# Patient Record
Sex: MALE | Race: ASIAN | NOT HISPANIC OR LATINO | Employment: FULL TIME | URBAN - METROPOLITAN AREA
[De-identification: names, ages, dates, MRNs, and addresses within clinical notes are randomized per-mention and may not be internally consistent; named-entity substitution may affect disease eponyms.]

---

## 2018-06-07 ENCOUNTER — HOSPITAL ENCOUNTER (EMERGENCY)
Facility: HOSPITAL | Age: 58
Discharge: HOME | End: 2018-06-07
Attending: EMERGENCY MEDICINE
Payer: MEDICAID

## 2018-06-07 VITALS
HEART RATE: 69 BPM | HEIGHT: 61 IN | OXYGEN SATURATION: 100 % | TEMPERATURE: 98 F | DIASTOLIC BLOOD PRESSURE: 71 MMHG | BODY MASS INDEX: 24.73 KG/M2 | SYSTOLIC BLOOD PRESSURE: 150 MMHG | WEIGHT: 131 LBS | RESPIRATION RATE: 19 BRPM

## 2018-06-07 DIAGNOSIS — M54.16 LUMBAR RADICULOPATHY, ACUTE: Primary | ICD-10-CM

## 2018-06-07 PROCEDURE — 99283 EMERGENCY DEPT VISIT LOW MDM: CPT | Mod: 25

## 2018-06-07 PROCEDURE — 63600175 PHARM REV CODE 636 W HCPCS: Performed by: NURSE PRACTITIONER

## 2018-06-07 PROCEDURE — 96372 THER/PROPH/DIAG INJ SC/IM: CPT

## 2018-06-07 PROCEDURE — 25000003 PHARM REV CODE 250: Performed by: NURSE PRACTITIONER

## 2018-06-07 RX ORDER — DEXAMETHASONE SODIUM PHOSPHATE 4 MG/ML
8 INJECTION, SOLUTION INTRA-ARTICULAR; INTRALESIONAL; INTRAMUSCULAR; INTRAVENOUS; SOFT TISSUE
Status: COMPLETED | OUTPATIENT
Start: 2018-06-07 | End: 2018-06-07

## 2018-06-07 RX ORDER — PREDNISONE 50 MG/1
50 TABLET ORAL DAILY
Qty: 5 TABLET | Refills: 0 | Status: SHIPPED | OUTPATIENT
Start: 2018-06-07 | End: 2018-06-12

## 2018-06-07 RX ORDER — HYDROCODONE BITARTRATE AND ACETAMINOPHEN 10; 325 MG/1; MG/1
1 TABLET ORAL
Status: COMPLETED | OUTPATIENT
Start: 2018-06-07 | End: 2018-06-07

## 2018-06-07 RX ORDER — TRAMADOL HYDROCHLORIDE 50 MG/1
50 TABLET ORAL EVERY 6 HOURS PRN
Qty: 14 TABLET | Refills: 0 | Status: SHIPPED | OUTPATIENT
Start: 2018-06-07 | End: 2018-06-17

## 2018-06-07 RX ADMIN — DEXAMETHASONE SODIUM PHOSPHATE 8 MG: 4 INJECTION, SOLUTION INTRAMUSCULAR; INTRAVENOUS at 09:06

## 2018-06-07 RX ADMIN — HYDROCODONE BITARTRATE AND ACETAMINOPHEN 1 TABLET: 10; 325 TABLET ORAL at 09:06

## 2018-06-07 NOTE — ED PROVIDER NOTES
Encounter Date: 6/7/2018       History     Chief Complaint   Patient presents with    Back Pain     pain shoots down left leg- present for 5 weeks     58 year old male with complaint of left lower back pain with radiation into left leg X 5 weeks.  NO fall. NO trauma.  Moderate pain.  Worse with movement.  No loss of bowel or bladder function.  No alleviating factors.           Review of patient's allergies indicates:  No Known Allergies  History reviewed. No pertinent past medical history.  History reviewed. No pertinent surgical history.  History reviewed. No pertinent family history.  Social History   Substance Use Topics    Smoking status: Current Every Day Smoker    Smokeless tobacco: Not on file    Alcohol use Yes     Review of Systems   Constitutional: Negative for fever.   HENT: Negative for sore throat.    Respiratory: Negative for shortness of breath.    Cardiovascular: Negative for chest pain.   Gastrointestinal: Negative for nausea.   Genitourinary: Negative for dysuria.   Musculoskeletal: Positive for back pain.   Skin: Negative for rash.   Neurological: Negative for weakness.   Hematological: Does not bruise/bleed easily.       Physical Exam     Initial Vitals [06/07/18 0911]   BP Pulse Resp Temp SpO2   (!) 155/81 87 18 98.7 °F (37.1 °C) 98 %      MAP       105.67         Physical Exam    Nursing note and vitals reviewed.  Constitutional: He appears well-developed and well-nourished.   HENT:   Head: Normocephalic and atraumatic.   Eyes: Conjunctivae are normal. Pupils are equal, round, and reactive to light.   Neck: Normal range of motion. Neck supple.   Cardiovascular: Normal rate, regular rhythm, normal heart sounds and intact distal pulses.   Pulmonary/Chest: Breath sounds normal.   Abdominal: Soft. There is no rebound and no guarding.   Musculoskeletal: Normal range of motion.   No spine tenderness, left lateral lumbar tenderness, pain with ROM of lumbar spine, left straight leg positive at 45  degrees   Neurological: He is alert and oriented to person, place, and time. He has normal strength and normal reflexes.   Skin: Skin is warm and dry.   Psychiatric: He has a normal mood and affect. His behavior is normal. Thought content normal.         ED Course   Procedures  Labs Reviewed - No data to display       No orders to display                             Clinical Impression:   The encounter diagnosis was Lumbar radiculopathy, acute.                             Lorne Ragland NP  06/07/18 0975

## 2018-06-19 ENCOUNTER — INITIAL CONSULT (OUTPATIENT)
Dept: PHYSICAL MEDICINE AND REHAB | Facility: CLINIC | Age: 58
End: 2018-06-19
Payer: MEDICAID

## 2018-06-19 ENCOUNTER — HOSPITAL ENCOUNTER (OUTPATIENT)
Dept: RADIOLOGY | Facility: HOSPITAL | Age: 58
Discharge: HOME | End: 2018-06-19
Attending: PHYSICAL MEDICINE & REHABILITATION
Payer: MEDICAID

## 2018-06-19 VITALS — HEART RATE: 85 BPM | SYSTOLIC BLOOD PRESSURE: 135 MMHG | DIASTOLIC BLOOD PRESSURE: 80 MMHG

## 2018-06-19 DIAGNOSIS — M54.16 LUMBAR RADICULOPATHY, ACUTE: Primary | ICD-10-CM

## 2018-06-19 DIAGNOSIS — M54.16 LUMBAR RADICULOPATHY, ACUTE: ICD-10-CM

## 2018-06-19 PROCEDURE — 99213 OFFICE O/P EST LOW 20 MIN: CPT | Mod: PBBFAC,PO,25 | Performed by: PHYSICAL MEDICINE & REHABILITATION

## 2018-06-19 PROCEDURE — 72110 X-RAY EXAM L-2 SPINE 4/>VWS: CPT | Mod: TC,FY,PO

## 2018-06-19 PROCEDURE — 72110 X-RAY EXAM L-2 SPINE 4/>VWS: CPT | Mod: 26,,, | Performed by: RADIOLOGY

## 2018-06-19 PROCEDURE — 99204 OFFICE O/P NEW MOD 45 MIN: CPT | Mod: S$PBB,,, | Performed by: PHYSICAL MEDICINE & REHABILITATION

## 2018-06-19 PROCEDURE — 99999 PR PBB SHADOW E&M-EST. PATIENT-LVL III: CPT | Mod: PBBFAC,,, | Performed by: PHYSICAL MEDICINE & REHABILITATION

## 2018-06-19 RX ORDER — KETOROLAC TROMETHAMINE 10 MG/1
10 TABLET, FILM COATED ORAL 2 TIMES DAILY PRN
Qty: 14 TABLET | Refills: 0 | Status: SHIPPED | OUTPATIENT
Start: 2018-06-19 | End: 2018-06-19 | Stop reason: SDUPTHER

## 2018-06-19 RX ORDER — GABAPENTIN 300 MG/1
300 CAPSULE ORAL 3 TIMES DAILY
Qty: 90 CAPSULE | Refills: 1 | Status: SHIPPED | OUTPATIENT
Start: 2018-06-19 | End: 2018-10-01

## 2018-06-19 RX ORDER — GABAPENTIN 300 MG/1
300 CAPSULE ORAL 3 TIMES DAILY
Qty: 90 CAPSULE | Refills: 1 | Status: SHIPPED | OUTPATIENT
Start: 2018-06-19 | End: 2018-06-19 | Stop reason: SDUPTHER

## 2018-06-19 RX ORDER — KETOROLAC TROMETHAMINE 10 MG/1
10 TABLET, FILM COATED ORAL 2 TIMES DAILY PRN
Qty: 14 TABLET | Refills: 0 | Status: SHIPPED | OUTPATIENT
Start: 2018-06-19 | End: 2018-06-26

## 2018-06-19 NOTE — PATIENT INSTRUCTIONS
What Is Lumbar Epidural Injection?  A lumbar epidural injection, which contains a numbing medicine and a steroid, wont stop all low back and leg pain. But it can reduce pain and break the pain cycle. This cycle may begin when back pain makes it hard to move. Lack of movement can then slow down the healing process. By getting you back on your feet, the injection can help speed your recovery. Some people may feel more relief from an injection than others. And some people may need more than one injection to get relief. Usually, no more than 3 injections are done in a 12 month period. If the first injection did not help, it is less likely that another one will help.  A tool for diagnosis  An injection can help locate the source of pain. Also called a selective nerve block or a selective epidural, it numbs the roots of specific nerves. The effect lasts only briefly, but if you feel relief, it may indicate the source of the pain. If you feel no relief, it may mean that the pains source is at another level in your spine. Or it may mean that something other than inflammation is causing the pain. Injection results also may be used to help plan back surgery, if needed.  Possible risks and complications  Here are some risks of lumbar epidural:  · Spinal headache  · Bleeding (rare)  · Infection (rare)   Date Last Reviewed: 10/31/2015  © 6954-2213 [employer]. [address] [place] [address], [place] [address]. All rights reserved. This information is not intended as a substitute for professional medical care. Always follow your healthcare professional's instructions.        Lumbar Epidural Injection: Your Procedure  A lumbar epidural injection is an outpatient procedure. Its often done in a hospital or an outpatient surgery center. Before your injection, your healthcare provider will discuss how you need to prepare.  Getting ready  You may need to prepare by doing the following:  · Give the doctor a list of all  medicines you take, including aspirin and anti-inflammatories. (You may need to stop taking some of them before the injection.)  · You may be asked not to eat or drink anything for several hours before check-in.  · Arrange for an adult friend or family member to drive you home afterward.  · Bring any requested X-ray, CT, or MRI images on the day of the procedure.  During the procedure  The injection takes just a few minutes. But extra time is needed to get ready. You may be given medicine before the injection to help you relax:  · In some cases, monitoring devices may be attached to your chest or side. These devices measure your heart rate, breathing, and blood pressure.  · You lie on your stomach or side, depending on where the injection will be given. Your back is cleaned and may be covered with sterile towels.  · Medicine is given to numb the skin near the injection site.  · If X-ray imaging (fluoroscopy) is to be used, a contrast dye may be injected into your back. This helps your doctor get a better image.  · A local anesthetic (for numbing), steroids (for reducing inflammation), or both are injected into the epidural space.  The procedure is very safe, but there is a very small risk of infection or local reaction afterward. If you have increasing pain, headaches (especially when standing up) redness, fever, or symptoms of infection, seek medical attention right away.   After the procedure  Youll spend time in a recovery area after the procedure. Before going home, you may be asked to fill out another survey about your pain.  Date Last Reviewed: 11/1/2015 © 2000-2017 [employer]. [address] [place] [address]. All rights reserved. This information is not intended as a substitute for professional medical care. Always follow your healthcare professional's instructions.        Understanding Lumbar Radiculopathy    Lumbar radiculopathy is irritation or inflammation of a nerve root in  the low back. It causes symptoms that spread out from the back down one or both legs. To understand this condition, it helps to understand the parts of the spine:  · Vertebrae. These are bones that stack to form the spine. The lumbar spine contains the 5 bottom vertebrae.  · Disks. These are soft pads of tissue between the vertebrae. They act as shock absorbers for the spine.  · Spinal canal. This is a tunnel formed within the stacked vertebrae. In the lumbar spine, nerves run through this canal.  · Nerves. These branch off and leave the spinal canal, traveling out to parts of the body. As they leave the spinal canal, nerves pass through openings between the vertebrae. The nerve root is the part of the nerve that is closest to the spinal canal.  · Sciatic nerve. This is a large nerve formed from several nerve roots in the low back. This nerve extends down the back of the leg to the foot.  With lumbar radiculopathy, nerve roots in the low back become irritated. This leads to pain and symptoms. The sciatic nerve is commonly involved, so the condition is often called sciatica.  What causes lumbar radiculopathy?  Aging, injury, poor posture, extra body weight, and other issues can lead to problems in the low back. These problems may then irritate nerve roots. They include:  · Damage to a disk in the lumbar spine. The damaged disk may then press on nearby nerve roots.  · Degeneration from wear and tear, and aging. This can lead to narrowing (stenosis) of the openings between the vertebrae. The narrowed openings press on nerve roots as they leave the spinal canal.  · Unstable spine. This is when a vertebra slips forward. It can then press on a nerve root.  Other, less common things can put pressure on nerves in the low back. These include diabetes, infection, or a tumor.  Symptoms of lumbar radiculopathy  These include:  · Pain in the low back  · Pain, numbness, tingling, or weakness that travels into the buttocks, hip,  groin, or leg  · Muscle spasms  Treatment for lumbar radiculopathy  In most cases, your healthcare provider will first try treatments that help relieve symptoms. These may include:  · Prescription and over-the-counter pain medicines. These help relieve pain, swelling, and irritation.  · Limits on positions and activities that increase pain. But lying in bed or avoiding all movement is only recommended for a short period of time.  · Physical therapy, including exercises and stretches. This helps decrease pain and increase movement and function.  · Steroid shots into the lower back. This may help relieve symptoms for a time.  · Weight-loss program. If you are overweight, losing extra pounds may help relieve symptoms.  In some cases, you may need surgery to fix the underlying problem. This depends on the cause, the symptoms, and how long the pain has lasted.  Possible complications  Over time, an irritated and inflamed nerve may become damaged. This may lead to long-lasting (permanent) numbness or weakness in your legs and feet. If symptoms change suddenly or get worse, be sure to let your healthcare provider know.  When to call your healthcare provider  Call your healthcare provider right away if you have any of these:  · New pain or pain that gets worse  · New or increasing weakness, tingling, or numbness in your leg or foot  · Problems controlling your bladder or bowel   Date Last Reviewed: 3/10/2016  © 4564-8583 [employer]. [address] [place] [address], [place] [address]. All rights reserved. This information is not intended as a substitute for professional medical care. Always follow your healthcare professional's instructions.        Exercises to Strengthen Your Lower Back  Strong lower back and abdominal muscles work together to support your spine. The exercises below will help strengthen the lower back. It is important that you begin exercising slowly and increase levels gradually.  Always begin any  exercise program with stretching. If you feel pain while doing any of these exercises, stop and talk to your doctor about a more specific exercise program that better suits your condition.   Low back stretch  The point of stretching is to make you more flexible and increase your range of motion. Stretch only as much as you are able. Stretch slowly. Do not push your stretch to the limit. If at any point you feel pain while stretching, this is your (temporary) limit.  · Lie on your back with your knees bent and both feet on the ground.  · Slowly raise your left knee to your chest as you flatten your lower back against the floor. Hold for 5 seconds.  · Relax and repeat the exercise with your right knee.  · Do 10 of these exercises for each leg.  · Repeat hugging both knees to your chest at the same time.  Building lower back strength  Start your exercise routine with 10 to 30 minutes a day, 1 to 3 times a day.  Initial exercises  Lying on your back:  1. Ankle pumps: Move your foot up and down, towards your head, and then away. Repeat 10 times with each foot.  2. Heel slides: Slowly bend your knee, drawing the heel of your foot towards you. Then slide your heel/foot from you, straightening your knee. Do not lift your foot off the floor (this is not a leg lift).  3. Abdominal contraction: Bend your knees and put your hands on your stomach. Tighten your stomach muscles. Hold for 5 seconds, then relax. Repeat 10 times.  4. Straight leg raise: Bend one leg at the knee and keep the other leg straight. Tighten your stomach muscles. Slowly lift your straight leg 6 to 12 inches off the floor and hold for up to 5 seconds. Repeat 10 times on each side.  Standin. Wall squats: Stand with your back against the wall. Move your feet about 12 inches away from the wall. Tighten your stomach muscles, and slowly bend your knees until they are at about a 45 degree angle. Do not go down too far. Hold about 5 seconds. Then slowly return  to your starting position. Repeat 10 times.  2. Heel raises: Stand facing the wall. Slowly raise the heels of your feet up and down, while keeping your toes on the floor. If you have trouble balancing, you can touch the wall with your hands. Repeat 10 times.  More advanced exercises  When you feel comfortable enough, try these exercises.  1. Kneeling lumbar extension: Begin on your hands and knees. At the same time, raise and straighten your right arm and left leg until they are parallel to the ground. Hold for 2 seconds and come back slowly to a starting position. Repeat with left arm and right leg, alternating 10 times.  2. Prone lumbar extension: Lie face down, arms extended overhead, palms on the floor. At the same time, raise your right arm and left leg as high as comfortably possible. Hold for 10 seconds and slowly return to start. Repeat with left arm and right leg, alternating 10 times. Gradually build up to 20 times. (Advanced: Repeat this exercise raising both arms and both legs a few inches off the floor at the same time. Hold for 5 seconds and release.)  3. Pelvic tilt: Lie on the floor on your back with your knees bent at 90 degrees. Your feet should be flat on the floor. Inhale, exhale, then slowly contract your abdominal muscles bringing your navel toward your spine. Let your pelvis rock back until your lower back is flat on the floor. Hold for 10 seconds while breathing smoothly.  4. Abdominal crunch: Perform a pelvic tilt (above) flattening your lower back against the floor. Holding the tension in your abdominal muscles, take another breath and raise your shoulder blades off the ground (this is not a full sit-up). Keep your head in line with your body (dont bend your neck forward). Hold for 2 seconds, then slowly lower.  Date Last Reviewed: 6/1/2016  © 9410-9145 [employer]. [address] [place] [address], [place] [address]. All rights reserved. This information is not intended as a  substitute for professional medical care. Always follow your healthcare professional's instructions.        Possible Causes of Low Back or Leg Pain    The symptoms in your back or leg may be due to pressure on a nerve. This pressure may be caused by a damaged disk or by abnormal bone growth. Either way, you may feel pain, burning, tingling, or numbness. If you have pressure on a nerve that connects to the sciatic nerve, pain may shoot down your leg.    Pressure from the disk  Constant wear and tear can weaken a disk over time and cause back pain. The disk can then be damaged by a sudden movement or injury. If its soft center begins to bulge, the disk may press on a nerve. Or the outside of the disk may tear, and the soft center may squeeze through and pinch a nerve.    Pressure from bone  As a disk wears out, the vertebrae right above and below the disk begin to touch. This can put pressure on a nerve. Often, abnormal bone (called bone spurs) grows where the vertebrae rub against each other. This can cause the foramen or the spinal canal to narrow (called stenosis) and press against a nerve.  Date Last Reviewed: 10/4/2015  © 9720-3016 [employer]. [address] [place] [address]. All rights reserved. This information is not intended as a substitute for professional medical care. Always follow your healthcare professional's instructions.           Tiêm trên màng c?ng ngang l?ng (Lumbar Epidural Injection) là gì?  Bác s? c?a quý v? có th? ?ã khuyên quý v? nên tiêm ashn màng c?ng ngang l?ng. Th? thu?t này có th? giúp gi?m ?au l?ng d??i và ?au chân nh? gi?m s?ng viêm (s?ng phù và [facility]). Tiêm c?ng có th? giúp bác s? c?a quý v? ch?n ?oán dhara?n g?c gây ?au b?ng cách gây tê m?t s? khu v?c nh?t ??nh ? l?ng c?a quý v?. Ch? quý v? ???c tiêm ph? thu?c vào m?c ?ích c?a vi?c tiêm.    Cách gi?m ?au  Tiêm ashn màng c?ng nickang l?ng s? mary d?ng eileen b? ph?n ?au l?ng d??i jarod tipton c?a quý v?. Nh?ng nó có  th? gi?m ?au và phá v? salmeron k? ?au. Salmeron k? này có th? b?t ??u khi ?au l?ng h?n ch? vi?c rubén?n ??ng. Vi?c thi?u rubén?n ??ng l?i làm ch?m quá trình h?i ph?c. V?i vi?c ??ng lên ?i l?i, thu?c tiêm có th? giúp ??y nhanh quá trình h?i ph?c c?a quý v?. M?t s? ng??i có th? c?m th?y gi?m ?au nh? tiêm luisa?u h?n so v?i nh?ng ng??i khác. M?t s? ng??i l?i có th? c?n tiêm luisa?u h?n m?t l?n thì m?i gi?m ?au ???c.  D?ng c? ch?n ?oán  Tiêm có th? giúp phát hi?n dhara?n g?c gây ?au. Còn ???c g?i là jese b? th?n kinh (nerve block) ch?n l?c hay trên màng c?ng (epidural) ch?n l?c, tiêm làm tê r? các dây th?n kinh c? th?. Tác d?ng ch? kéo dài marissa th?i denice r?t ng?n. Nh?ng n?u quý v? c?m th?y gi?m ?âu, ?i?u ?ó có th? báo hi?u ?ó là dhara?n g?c gây ?au. N?u quý v? không th?y gi?m ?au, ?i?u ?ó có th? ??ng ngh?a dhara?n g?c gây ?au ? m?t c?p c?t s?ng khác. Ho?c có th? là do nguyên nhân khác không ph?i viêm s?ng m?i gây ra c?n ?au. K?t qu? tiêm c?ng có th? ???c dùng ?? giúp l?p k? ho?ch ph?u thu?t l?ng n?u c?n.       Các r?i ro và bi?n ch?ng có th? c?a tiêm trên màng c?ng ngang l?ng  · ?âu c?t s?ng  · Ch?y máu (hi?m)  · Luisa?m trùng (hi?m)      Date Last Reviewed: 10/31/2015  © 6874-8607 [employer]. [address] [place] [id number]. All rights reserved. This information is not intended as a substitute for professional medical care. Always follow your healthcare professional's instructions.        Tiêm trên màng c?ng ngang l?ng (Lumbar Epidural Injection): Th? thu?t c?a quý v?  Tiêm trên màng c?ng ngang l?ng là m?t th? thu?t mcclure?i trú. Nó th??ng ???c th?c hi?n ? b?nh vi?n ho?c m?t arlene tâm ph?u thu?t mcclure?i trú. Tr??c khi tiêm, nhân viên y t? c?a quý v? s? th?o froilan?n v? công tác salmeron?n b? c?a quý v?.  S?n sàng  Quý v? có th? c?n ph?i salmeron?n b? b?ng cách vi?c josé miguel:  · ??a cho bác s? m?t alda sách t?t c? các lo?i thu?c mà quý v? dùng, falguni g?m aspirin và thu?c ch?ng viêm. (Quý v? có th? c?n ph?i d?ng dùng m?t s? lo?i thu?c tr??c  "khi tiêm.)  · Không ???c ?n. 6 gi? tr??c khi vào khám hay u?ng b?t c? th? gì. 4 gi? tr??c ?ó.  · B? trí m?t ng??i b?n ho?c thành viên eliza ?ình l?n tu?i ch? quý v? v? nhà katherine ?ó.  · [place] brennen ?nh ch?p X-dony, CT ho?c MRI brennen yêu c?u vào ngày th?c hi?n th? thu?t.    Arjun khi th?c hi?n th? thu?t  Tiêm ch? m?t vài phút. Nh?ng c?n thêm th?i denice ?? barahona?n b? s?n sàng. Quý v? có th? ???c tiê thu?c tr??c khi tiêm ?? giúp th? giãn.  · Thi?t b? brennen dõi có th? ???c g?n lên ng?c ho?c s??n c?a quý v?. Các thi?t b? này brennen dõi nh?p leodan, h?i th? và tara?t áp c?a quý v?.  · Quý v? n?m úp b?ng ho?c n?m nghiêng, tùy vào v? trí tiêm. L?ng c?a quý v? ???c webb và ph? m?t l?p kh?n ti?t trùng.  · Thu?c ???c dùng ?? làm tê ph?n da g?n ?i?m tiêm.  · N?u s? d?ng phóng x? ragini?n (t?o ?nh X-dony), m?t "ch?t t?o màu" t??ng ph?i có th? ???c tiêm vào l?ng c?a quý v?. Ch?t này giúp t?o hình ?nh ch?t t?t h?n.  · M?t lo?i thu?c gây mê c?c b? (?? làm tê), steroid (?? gi?m s?ng viêm) ho?c c? willie ???c tiêm vào khoang trên màng c?ng.  Katherine khi th?c hi?n th? thu?t  Quý v? m?t beena?ng m?t gi? ? khu h?i s?c. Tr??c khi v? nhà, quý v? có th? ???c yêu c?u ?i?n vào m?t b?ng ?i?u tra khác v? c?n ?au c?a mình.  Date Last Reviewed: 11/1/2015  © 3248-9887 [employer]. [address] [place] [address], [place] [address]. All rights reserved. This information is not intended as a substitute for professional medical care. Always follow your healthcare professional's instructions.        B?nh ?au Th?n Kinh T?a [Sciatica]  B? nh ?au t h?n kinh t?a b?nh r? th?n kinh ? th? t l?ng  (sciatica "Lumbar Radiculopathy") gây nên c?n ?au shahbaz t ?a t? l?ng d? ?i jenkins? ng ? ?n mông, hông và c?ng chân. ?ôi lúc, ?au c ?ng chân có th? x?y ra mà không có ?au l?ng . B? nh ? au th?n kinh t?a do kích thích ho?c áp l? c ?è lên dây th ?n kinh c?t s?ng khi nó ra kh?i ?ng s?ng. ?i ?u này ? a s? là do ? ?a s?ng (l? p ? ?m s?n gi?a m?i x??ng c ?t s?ng) g? n ?ó b ? phình ho?c thoát v?, ?è " lên dây th ?n kinh g? n ?ó . Các nguyên nhân khác falguni g?m h?p c?t s?ng (h?p ?ng s?ng) và co th?t c?a b?p th?t hình qu? lê (m?t b?p th?t ? mông mà các dây th?n kinh t? a ?i quentin ).    Ch? ng ?au th?n kinh t?a có th? b? t ? ?u josé miguel m?t s?c l?c v?n tr?o/cúi jenkins? ng ? ?t ng? t (nh? marissa m ?t richard n? n xe h?i), ho?c josé miguel m?t c? ? ?ng v?ng v? ??n gi ?n. C ho dù tr? ?ng h?p nào, th? ?ng có s? co th?t c?a b?p th?t và nó góp ph? n vào c?n ?au.  S? ch? n ?oán ch ? ng ? au th?n kinh t?a ?? ?c th?c hi?n t? các tri?u ch?ng và khám s?c kh?e. Tr? khi quý v? ? ã b? ch? n th??ng v ? th? ch?t ( nh? richard n?n xe h?i ho?c té ngã) còn thì ch?p dony ragini? n th? ? ng không ?? ?c ch? ? ? nh ? ? th? m ? ?nh s? kh ?i ch? ng ?au th ?n kinh t?a b?i vì các dây th? n kinh và ? ?a không th? th? y ?? ?c marissa hình ch?p dony ragini?n. N?u có các d?u hi?u dây th?n kinh b? ?è nén (ch?ng h? n nh? m?t ph?n x? c?a gân ho?c s?c m?nh ? c?ng chân), s? c?n ph?i s?p x?p làm MRI (ch?p c? ng h? ?ng t?) v? i t? cách b?nh nhân mcclure?i trú.  Ph?n l?n b? nh ? au th?n kinh t?a (80-90%) ? ? h?n v ?i thu?c, t?p th? d?c, v?t lý tr? li?u. N?u các tri?u ch?ng ti?p t?c josé miguel khi ?i ?u tr? ít nh?t là ba tháng, có th? ph?i xem xét ? ?n vi?c làm ph?u thu?t.  Ch?m Sóc T?i Milka:  1) Quý v? có th? c?n n?m ngh? trê n gi? ?ng marissa nh? ng ngày ? ?u. Nh?ng h ãy b? t ? ?u ng?i d?y ho? c ?i l?i càng s?m càng t? t ? ? tránh các v? n ? ? gây nên do ngh? ng?i trên gi? ?ng quá lâu.  2) Khi n? m trên g? ?ng, c? ki?m m? t t? th ? tho?i mái. N?m c?ng là t?t nh?t. Ráng n?m th? ng l?ng, kê g ? i d? ? i ? ?u g?i c?a quý v?. Quý v? c?ng có th? th? n? m nghiêng, ? ?u g?i g?p l?i v? phía ng?c và kê g?i gi?a willie ? ?u g?i c?a quý v?.  3) Tránh ng?i lâu. ?i ?u này ? ?t s? c ép lên l?ng d? ?i nola?u h?n là ? ?ng ho? c ?i.  4) M?t s? ng? ?i th?y b? t ?au b ?ng cách dùng h?i nóng (vòi sen, b?n t?m nóng ho?c mi? ng lót ? ?m nóng) và kimberly estrada , marissa chavez nh?nick ng? ? i anirudh english ? ?m l?nh (?á v ?n ho? c ?á c ?c b?  marissa túi plastic và b? c marissa kh?n). Hãy th? c? willie ph??ng pháp và dùng ph??ng pháp nào cho là t ?t nh?t marissa 20 phút và vài l?n m?i ngày.  5) Quý v? có th? dùng acetaminophen (Tylenol) ho? c ibuprofen (Motrin, Advil) ? ? ki? m soát c?n ?au, tr ? khi ? ã ?? ?c cho dùng m?t lo?i thu?c ch? ng ?au khác. [L?U Ý: N?u quý v? b? b?nh esdras ho?c th?n mãn tính ho?c t?ng b? loét falguni t? (stomach ulcer) ho?c jenkins?t tara? t ?? ?ng tiêu hóa (GI bleeding), hãy bàn v?i bác s? c?a quý v? tr? ?c khi dùng các lo?i thu?c này.]  6) Nên bi? t các ph??ng pháp nâng v ?t n?ng m? t cách an toàn và ? ?ng nâng v?t nào trên 15 pound cho ? ?n khi h?t ?au .  Ti?p T?c Tommy Dõi  v?i bác s? c?a quý v? ho?c v? i c? s ? này n?u các tri?u ch?ng c?a quý v? không b? t ? ? u ? ? h?n josé miguel m ?t tu?n l?. Có th? c?n làm v?t lý tr? li?u ho?c các xét moustapha?m thêm.  [L?U Ý: N? u ? ã ch?p dony ragini?n, bác s? chuyên lio dony ragini?n s? xem l?i vi?c này. Quý v? s? ?? ?c thông báo v? b?t k? khám phá m?i nào có th? ? nh h? ? ng ? ?n vi? c ch?m sóc c ?a quý v?.]  N?u x?y ra b?t c? ?i?u nào josé miguel ?ây hãy L?P T?C ?I?U TR? Y T?:  -- C?n ?au tr ? nên t? h?n , không ki? m soát ?? ?c b?ng thu? c ? ã ?? ?c ch? ? ?nh  -- Gideon y?u ho?c tê m?t ho?c c? willie c?ng chân  -- Tê n?i háng, b? ph?n sinh d?c  -- Không ki?m ch? ?? ? c ? ?i ti?n ho?c ti?u ti?n  Date Last Reviewed: 7/24/2014  © 7183-5690 [employer]. [address] [place] [address], [place] [address]. All rights reserved. This information is not intended as a substitute for professional medical care. Always follow your healthcare professional's instructions.        Các nguyên nhân gây ?au ngang l?ng (l?ng d??i)  ?au l?ng d??i có th? ???c gây ra b?i các v?n ?? v?i ph?n c?t s?ng ngang l?ng. M?t ??a ??m có th? ?ã b? thoát v? (??y ra ngoài) và ?è lên dây th?n kinh. ??t s?ng có th? c? xát vào nhau ho?c tr??t ra kh?i v? trí. Chúng có th? kích ?ng các kh?p m?m bên và dây th?n kinh. ?i?u này c?ng có th? d?n t?i ch?ng chít h?p  (stenosis), h?p c?t s?ng ho?c l?.  Áp l?c t? ??a ??m  Th??ng xuyên c? xát và mòn ??a ??m làm ??a y?u ?i và ??y ra ngoài. Khi ?ó, m?t ph?n ??a ?è lên dây th?n kinh bên c?nh. Có willie lo?i ??a thoát v? th??ng g?p.  Bên marissa có ngh?a là ph?n h?ch m?m nhô ra ngoài.     Ch?a ??a thoát v?    Nhô ra ngoài có ngh?a là vành ch?c b? rách, ?? l? ph?n gi?a m?m nhô ra.     ??a thoát v? nhô ra      Áp l?c t? x??ng  C?t s?ng không ?n ??nh   Tommy tu?i tác, ??a ??m b? m?ng và y?u ?i. ??t s?ng bên trên và bên d??i ??a ??m b?t ??u ch?m vào nhau. ?i?u này có th? t?o áp l?c lên dây th?n kinh. Nó c?ng có th? t?o gai x??ng (x??ng phát tri?n) n?i x??ng c? xát vào nhau.     Chít h?p   Ch?ng chít h?p x?y ra khi gai x??ng làm h?p l? ho?c c?t s?ng. ?i?u này c?ng có th? t?o áp l?c lên dây th?n kinh. ??t s?ng b? tr??t có th? kích ?ng dây th?n kinh và kh?p. Chúng c?ng có th? làm ch?ng chít h?p t? h?n.     Ch?ng tr??t c?t s?ng   Marissa m?t s? tr??ng h?p, ??t s?ng tr? nên inésông ?n ??nh và b? tr??t v? phía tr??c. ?ây ???c g?i là b? ch?ng tr??t c?t s?ng (spondylolisthesis).     Date Last Reviewed: 10/12/2015  © 3583-2335 [employer]. [address] [place] [address], [place] [address]. All rights reserved. This information is not intended as a substitute for professional medical care. Always follow your healthcare professional's instructions.        Tiêm trên màng c?ng ngang l?ng (Lumbar Epidural Injection): Ph?c h?i ? nhà    Quý v? không c?n ph?i n?m trên gi??ng khi ? nhà. Th?c t?, t?t nh?t là nên ?i b? loanh quanh n?u quý v? ?? kh?e. Ch? c?n c?n th?n không v?n ??ng quá m?c. K? c? n?u quý v? ngay l?p t? th?y kh?e h?n thì c?ng nên tránh các ho?t ??ng khi?n l?ng c?a quý v? c?ng tr? l?i. Và hãy tuân th? m?i deanna trình ?i?u tr? v?i bác s? c?a quý v?.  ?i?u c?n bi?t v? gi?m ?au  Hãy l?u ý r?ng m?t s? b?nh nhân tho?t ??u s? c?m th?y ?au h?n. C?n ?au th??ng s? h?t josé miguel vài ngày. Quý v? c?ng có th? b? ?au ??u ho?c khó ng?. ?i?u này c?ng s? h?t ch? josé miguel vài  ngày. Nhpasquale davis:  · Tiêm gi?m s?ng viêm ph?i m?t m?t willie ngày m?i có tác d?ng. Th?m chí ban ??u quý v? có th? c?m th?y ?au h?n.  · Tiêm giúp xác ??nh dhara?n g?c gây ?au ch? có tác d?ng gi?m ?au t?m th?i. Katherine ?ó, quý v? s? c?m th?y ?au nh? tr??c khi tiêm.   Các m?o ?? h?i ph?c  Dù quý v? có ???c tiêm ?? gi?m ?au hay do ch?n ?oán, các m?o katherine s? v?n giúp quý v? h?i ph?c:  · ?i b? khi quý v? th?y ?? kh?e.  · Ngh? ng?i n?u c?n, nh?ng hãy ng?i d?y và di rubén?n loanh quanh katherine khi ng?i beena?ng n?a gi?.  · ??ng t?p luy?n quá m?nh.  · Không ???c lái xe vào ngày ti?n hành th? thu?t ho?c cho t?i khi bác s? c?a quý v? nói ??ng ý.  · Iberia l?i làm vi?c ho?c các ho?t ??ng khác khi bác s? c?a quý v? nói r?ng quý v? ?ã s?n sàng.  Khi nào thì g?i cho bác s? c?a quý v?  Hãy g?i ngay cho bác s? c?a quý v? n?u quý v? nh?n th?y b?t k? tri?u ch?ng nào katherine ?ây:  · ?au ho?c ?au ??u d? d?i  · M?t ki?m soát bàng dony ho?c ru?t  · S?t ho?c run  · T?y ?? ho?c s?ng quanh ?i?m tiêm       Date Last Reviewed: 11/1/2015  © 4888-9173 [employer]. [address] [place] [address], [place] [address]. All rights reserved. This information is not intended as a substitute for professional medical care. Always follow your healthcare professional's instructions.        Gi?m ?au l?ng  ?au l?ng là m?t v?n ?? th??ng g?p. Quý v? có th? làm c?ng c? l?ng do bê ?? quá n?ng ho?c di rubén?n jasper cách. S? c?ng c? l?ng có th? gây c?m giác khó ch?u, th?m chí là ?au. Có th? ph?i m?t vài tu?n m?i ph?c h?i ???c. ?? t? giúp b?n thân c?m th?y t?t h?n và ng?n ng?a s? c?ng c? l?ng v? katherine, hãy th? các m?o katherine:  L?u ý jordi tr?ng: Không ???c ??a aspirin cho tr? em ho?c tr? v? thành niên.        ? Ch??m ?á  ?á làm gi?m s? c?ng c? l?ng và s?ng phù. ?á có tác d?ng nola?u nh?t n?u denys ann [facility] 24-48 gi? katherine khi b? th??ng.  · B?c falguni ho?c túi ?á v?n marissa m?t t?m v?i webb ??a. (Không ???c ch??m ?á tr?c ti?p lên da c?a quý v?.)  · ??t ?á lên vùng l?ng b? ?au nola?u nh?t c?a malikaý  v?.  · Không ???c ch??m ?á lâu quá 20 phút m?i l?n.  · Quý v? nên s? d?ng ?á và l?n m?i ngày.  ? Dùng thu?c  Thu?c gi?m ?au bán quentin qu?y g?m có aspirin, acetaminophen và ibuprofen. Chúng có th? giúp làm gi?m c?m giác khó ch?u. M?t s? thu?c c?ng gi?m s?ng phù.  · Hãy nói cho bác s? c?a quý v? v? các lo?i thu?c mà quý v? ?ã dùng.  · Ch? ???c u?ng thu?c brennen ch? d?n.  ? Ch??m nola?t  Katherine 50 gi? ??u tiên, nola?t có th? làm giãn c? và c?i thi?n tu?n hoàn máu.  · Th? t?m b?ng b?n ho?c vòi barrett sen n??c ?m. Ho?c s? d?ng ??m s??i ?m ??t ? ch? ?? th?p. ?? tránh b? b?ng, hãy ?? m?t t?m v?i cách gi?a quý v? và ??m s??i.  · Không ???c s? d?ng ??m s??i lâu quá 15 phút m?i l?n. Không ???c ng? trên ??m s??i.  Date Last Reviewed: 9/1/2015  © 7162-8963 [employer]. [address] [place] [address], [place] [address]. All rights reserved. This information is not intended as a substitute for professional medical care. Always follow your healthcare professional's instructions.        ?au L?ng [Back Pain: Acute Or Chronic]  Ch? ng ?au l?ng th? ?ng gây nên do b?p th?t ho?c dây ch?ng c?a c?t s?ng b? ch? n th??ng . ?ôi lúc nh ? ng ? ?a tách r?i t? ng ? ? t x??ng marissa c ?t s?ng có th? nhô ra và gây ?au b?ng cách chèn ép lên dây th?n kinh g? n ?ó . Ch? ng ?au l?ng c ?ng có th? jenkins?t hi?n katherine m?t l?c v?n/cúi jenkins?ng ? ?t ng?t ( nh? marissa m ?t richard n? n xe h?i ), katherine m?t c? ? ?ng v?ng v? và ??n gi ?n, ho?c katherine khi nâng m?t v?t gì n?ng v? i t? th ? không h?p cách c? a c? th ?. Marissa m?t ho?c willie tr? ?ng h?p này, th? ?ng có s? co th?t c?a b?p th?t làm cho ?au thêm .    Ch? ng ?au l?ng c ? p tính th? ? ng ? ? h? n marissa vòng t? m? t ? ?n willie tu?n. Ch? ng ?au l?ng liên jordi ? ?n b?nh ? ?a, viêm kh?p c?t s?ng ho?c h?p t?y s?ng (h? p ?? ?ng d?n c?a c?t s?ng) có th? tr? nên mãn tính và kéo dài nola?u tháng ho?c nola? u n?m .  Tr? khi quý v? ? ã b? t? n th??ng v ? th? ch?t (ch?ng h?n b? richard n? n xe h?i ho ?c té ngã) còn không thì ch?p dony ragini? n th? ?  ng không ?? ?c ch? ? ? nh ? ? th?m ? ?nh ch? ng ?au l?ng vào lúc ban ? ?u. N? u c?n ?au ti ?p t? c và không ?áp ?ng v?i s? ?i ?u tr? y t? thì có th? cho ch?p dony ragini?n và làm các xét moustapha?m khác v? josé miguel.  Ch?m Sóc T?i Milka:  1. Quý v? có th? ph?i c?n n?m ngh? trên gi? ?ng marissa nh? ng ngày ? ?u. Nh?ng , càng s?m càng t?t, quý v? nên b? t ? ?u ng?i d?y ho? c ?i ? ? tránh nh?ng v? n ? ? do n?m ngh? trên gi? ?ng kéo dài (y?u b?p th?t, làm cho c? ng l?ng và ? au t? thêm, máu ? óng c?c marissa c?ng chân).  2. Khi n? m trên g? ?ng, ráng ki?m m? t t? th ? tho?i mái. N?m c?ng là t?t nh?t. Ráng n?m th? ng l?ng, kê g ? i d? ? i ? ?u g?i c?a quý v?. Quý v? c?ng có th? th? n? m nghiêng, ? ?u g?i g?p l?i v? phía ng?c và kê g?i gi? a willie ? ?u g?i c?a quý v?.  3. Tránh ng?i lâu. ?i ?u này làm t?ng thêm s ?c ép lên ph? n l?ng d? ? i h?n là ? ?ng ho? c ?i .  4. Marissa vòng willie ngày ? ?u josé miguel khi b? ch? n th??ng , ch? ?m TÚI N? ? C ?Á lên vùng b? ch? n th??ng marissa v òng 20 phút m?i 2-4 gi?. ?i ?u này làm gi? m s?ng và ?au . NITESH?T (vòi sen, b?n t?m nóng ho?c mi?ng lót ? ?m nóng) t?t cho b?p th?t b? co th?t. Quý v? có th? b? t ? ?u b? ng n? ? c ?á , ti? p ?ó rubén ? n sang h?i nóng josé miguel willie ngày . M?t s? b?nh nhân c?m th? y ? ? h?n khi luân phiên c h?a tr? b? ng n? ? c ?á và h ? i nóng. Dùng m? t ph? ?ng pháp nào mà quý v? c?m th?y t?t cho quý v?.  5. Quý v? có th? dùng acetaminophen (Tylenol) ho? c ibuprofen (Motrin, Advil) ? ? ki? m soát c?n ?au, tr ? khi ? ã ?? ?c cho dùng m?t lo?i thu?c ch? ng ?au khác. [L?U Ý: N?u quý v? b? b?nh esdras ho?c th?n mãn tính ho?c t?ng b? loét falguni t? (stomach ulcer) ho?c jenkins?t tara? t ?? ?ng tiêu hóa (GI bleeding), hãy bàn v?i bác s? c?a quý v? tr? ?c khi dùng các lo?i thu?c này.]  6. Nên bi? t các ph??ng pháp nâng v ?t m? t cách an toàn và ? ?ng nâng v?t nào trên 15 pounds cho ? ?n khi h?t ?au .  Ti?p T?c Tommy Dõi  v?i bác s? c?a quý v? ho?c v? i c? s ? này nh? ? ã h ? ?ng d?n ho?c n?u các tri?u ch?ng c?a quý  v? không b? t ? ? u ? ? h?n josé miguel m?t tu?n l?. V?t lý tr? li?u có th? c?n ? ?n.  [L?U Ý: N? u ? ã ch?p dony ragini?n, bác s? chuyên lio dony ragini?n s? xem l?i vi?c này. Quý v? s? ?? ?c thông báo v? b?t k? khám phá m?i nào có th? ? nh h? ? ng ? ?n vi? c ch?m sóc c ?a quý v?.]  N?u x?y ra b?t c? ?i?u nào josé miguel ?ây hãy L?P T?C ?I?U TR? Y T?:  -- C?n ?au tr ? nên t? h ?n ho ?c shahbaz jenkins?ng c?ng saeed quý v?  -- Y?u ho?c tê m?t ho?c c? willie c?ng chân  -- Không ki?m ch? ?? ? c ? ?i ti?n ho?c ti?u ti?n  -- Tê ? háng ho?c ? vùng b? ph?n sinh d?c  Date Last Reviewed: 11/12/2013  © 4034-5245 [employer]. [address] [place] [address], [place] [address]. All rights reserved. This information is not intended as a substitute for professional medical care. Always follow your healthcare professional's instructions.

## 2018-06-19 NOTE — PROGRESS NOTES
PM&R NEW PATIENT HISTORY & PHYSICAL :    Referring Physician:  Chief Complaint   Patient presents with    Back Pain    Leg Pain         HPI: This is a 58 y.o.  male being seen in clinic today for evaluation of left low back and leg pain over the past 2.5 months.  He complains of achy pain in his low back with radiation into his posterior lateral leg.  His symptoms began while bending and lifting at work.  He is uncomfortable with many positions. Sitting or changing the position of his leg provides some relief.  He was seen in the ED a few days ago and was given a steroid for relief. He feels his leg is weak and smaller than the right     History obtained from patient via -LPN (Martha) assisted due to MAARTI not available  Pt's daughter and wife in room, daughter didn't want to translate    Functional History:  Walking: limited  Transfers: Independent  Assistive devices: No  Power mobility: No  Falls: None     Needs help with:  Nothing - all ADLS normal    Cooking   Cleaning  Bathing   Dressing   Toileting     Past family, medical, social, and surgical history reviewed in chart    Review of Systems:     General- denies lethargy, weight change, fever, chills  Head/neck- denies swallowing difficulties  ENT- denies hearing changes  Cardiovascular-denies chest pain  Pulmonary- denies shortness of breath  GI- denies constipation or bowel incontinence  - denies bladder incontinence  Skin- denies wounds or rashes  Musculoskeletal- +weakness, +pain  Neurologic- +numbness and tingling  Psychiatric- denies depressive or psychotic features, denies anxiety  Lymphatic-denies swelling  Endocrine- denies hypoglycemic symptoms/DM history  All other pertinent systems negative     Physical Examination:  General: Well developed, well nourished male, NAD  HEENT:NCAT EOMI bilaterally   Pulmonary:Normal respirations    Spinal Examination: CERVICAL  Active ROM is within normal limits.  Inspection: No deformity of spinal  alignment.    Spinal Examination: LUMBAR or THORACIC  Active ROM is full in ext/flex while sitting in wc.  Inspection: No deformity of spinal alignment.  No palpable olisthesis.  Palpation: No vertebral tenderness to percussion.  ttp at left si joint and gluteus musculature   SLR Test (seated):positive on left    Bilateral Upper and Lower Extremities:  Pulses are 2+ at radial bilaterally.  Shoulder/Elbow/Wrist/Hand ROM   Hip/Knee/Ankle ROM wnl except limited at left hip and knee rom due to discomfort   Bilateral Extremities show normal capillary refill.  No signs of cyanosis, rubor, edema, skin changes, or dysvascular changes of appendages.  Nails appear intact.    Neurological Exam:  Cranial Nerves:  II-XII grossly intact    Manual Muscle Testing: (Motor 5=normal)  RIGHT Lower extremity: Hip flexion 5/5, Hip Abduction 5/5, Hip Adduction 5/5, Knee extension 5/5, Knee flexion 5/5, Ankle dorsiflexion 5/5, Extensor hallucis longus 5/5, Ankle plantarflexion 5/5  LEFT Lower extremity:  Hip flexion 4p/5, Hip Abduction 5/5,Hip Adduction 5/5, Knee extension 3+p/5, Knee flexion 5/5, Ankle dorsiflexion 5/5, Extensor hallucis longus 5/5, Ankle plantarflexion 5/5    No focal atrophy is noted of either upper or lower extremity.    Bilateral Reflexes:hypo at patellar  No clonus at knee or ankle.    Sensation: tested to light touch  - intact in legs except dec at left post-lateral leg to the dorsum of his foot  Gait: Not tested due to safety      IMPRESSION/PLAN: This is a 58 y.o.  male with probable left lumbar radiculopathy     1. Lumbar spine xray and MRI  2. Dr Anders present throughout history of physical. Pt will be scheduled for LILIBETH with Dr Anders  3. Ketorolac and gabapentin 300mg TID  4. Handouts on lumbar radiculopathy, LILIBETH, exercises provided to patient (in Libyan)    Eaw Mosher M.D.  Physical Medicine and Rehab

## 2018-06-22 ENCOUNTER — TELEPHONE (OUTPATIENT)
Dept: RADIOLOGY | Facility: HOSPITAL | Age: 58
End: 2018-06-22

## 2018-06-25 ENCOUNTER — HOSPITAL ENCOUNTER (OUTPATIENT)
Dept: RADIOLOGY | Facility: HOSPITAL | Age: 58
Discharge: HOME | End: 2018-06-25
Attending: PHYSICAL MEDICINE & REHABILITATION
Payer: MEDICAID

## 2018-06-25 DIAGNOSIS — M54.16 LUMBAR RADICULOPATHY, ACUTE: ICD-10-CM

## 2018-06-25 PROCEDURE — 72148 MRI LUMBAR SPINE W/O DYE: CPT | Mod: 26,,, | Performed by: RADIOLOGY

## 2018-06-25 PROCEDURE — 72148 MRI LUMBAR SPINE W/O DYE: CPT | Mod: TC,PO

## 2018-06-26 DIAGNOSIS — M54.16 LUMBAR RADICULOPATHY: Primary | ICD-10-CM

## 2018-06-26 DIAGNOSIS — M47.816 LUMBAR SPONDYLOSIS: ICD-10-CM

## 2018-10-01 ENCOUNTER — OFFICE VISIT (OUTPATIENT)
Dept: PHYSICAL MEDICINE AND REHAB | Facility: CLINIC | Age: 58
End: 2018-10-01
Payer: MEDICAID

## 2018-10-01 VITALS
BODY MASS INDEX: 24.73 KG/M2 | RESPIRATION RATE: 14 BRPM | HEIGHT: 61 IN | SYSTOLIC BLOOD PRESSURE: 155 MMHG | HEART RATE: 61 BPM | DIASTOLIC BLOOD PRESSURE: 81 MMHG | WEIGHT: 131 LBS

## 2018-10-01 DIAGNOSIS — M54.16 LUMBAR RADICULOPATHY, ACUTE: Primary | ICD-10-CM

## 2018-10-01 PROCEDURE — 99999 PR PBB SHADOW E&M-EST. PATIENT-LVL III: CPT | Mod: PBBFAC,,, | Performed by: PHYSICAL MEDICINE & REHABILITATION

## 2018-10-01 PROCEDURE — 99213 OFFICE O/P EST LOW 20 MIN: CPT | Mod: PBBFAC,PO | Performed by: PHYSICAL MEDICINE & REHABILITATION

## 2018-10-01 PROCEDURE — 99214 OFFICE O/P EST MOD 30 MIN: CPT | Mod: S$PBB,,, | Performed by: PHYSICAL MEDICINE & REHABILITATION

## 2018-10-01 RX ORDER — GABAPENTIN 400 MG/1
400 CAPSULE ORAL 3 TIMES DAILY
Qty: 90 CAPSULE | Refills: 5 | Status: SHIPPED | OUTPATIENT
Start: 2018-10-01

## 2018-10-01 NOTE — PATIENT INSTRUCTIONS
Các nguyên nhân gây ?au ngang l?ng (l?ng d??i)  ?au l?ng d??i có th? ???c gây ra b?i các v?n ?? v?i ph?n c?t s?ng ngang l?ng. M?t ??a ??m có th? ?ã b? thoát v? (??y ra ngoài) và ?è lên dây th?n kinh. ??t s?ng có th? c? xát vào nhau ho?c tr??t ra kh?i v? trí. Chúng có th? kích ?ng các kh?p m?m bên và dây th?n kinh. ?i?u này c?ng có th? d?n t?i ch?ng chít h?p (stenosis), h?p c?t s?ng ho?c l?.  Áp l?c t? ??a ??m  Th??ng xuyên c? xát và mòn ??a ??m làm ??a y?u ?i và ??y ra ngoài. Khi ?ó, m?t ph?n ??a ?è lên dây th?n kinh bên c?nh. Có willie lo?i ??a thoát v? th??ng g?p.  Bên marissa có ngh?a là ph?n h?ch m?m nhô ra ngoài.     Ch?a ??a thoát v?    Nhô ra ngoài có ngh?a là vành ch?c b? rách, ?? l? ph?n gi?a m?m nhô ra.     ??a thoát v? nhô ra      Áp l?c t? x??ng  C?t s?ng không ?n ??nh   Tommy tu?i tác, ??a ??m b? m?ng và y?u ?i. ??t s?ng bên trên và bên d??i ??a ??m b?t ??u ch?m vào nhau. ?i?u này có th? t?o áp l?c lên dây th?n kinh. Nó c?ng có th? t?o gai x??ng (x??ng phát tri?n) n?i x??ng c? xát vào nhau.     Chít h?p   Ch?ng chít h?p x?y ra khi gai x??ng làm h?p l? ho?c c?t s?ng. ?i?u này c?ng có th? t?o áp l?c lên dây th?n kinh. ??t s?ng b? tr??t có th? kích ?ng dây th?n kinh và kh?p. Chúng c?ng có th? làm ch?ng chít h?p t? h?n.     Ch?ng tr??t c?t s?ng   Marissa m?t s? tr??ng h?p, ??t s?ng tr? nên không ?n ??nh và b? tr??t v? phía tr??c. ?ây ???c g?i là b? ch?ng tr??t c?t s?ng (spondylolisthesis).     Date Last Reviewed: 10/12/2015  © 7549-1982 [employer]. [address] [place] [address], [place] [address]. All rights reserved. This information is not intended as a substitute for professional medical care. Always follow your healthcare professional's instructions.        Gi?m ?au l?ng  ?au l?ng là m?t v?n ?? th??ng g?p. Quý v? có th? làm c?ng c? l?ng do bê ?? quá n?ng ho?c di rubén?n jasper cách. S? c?ng c? l?ng có th? gây c?m giác khó ch?u, th?m chí là ?au. Có th? ph?i m?t vài tu?n m?i ph?c h?i ???c. ?? t? giúp b?n  thân c?m th?y t?t h?n và ng?n ng?a s? c?ng c? l?ng v? katherine, hãy th? các m?o katherine:  L?u ý jordi tr?ng: Không ???c ??a aspirin cho tr? em ho?c tr? v? thành niên.        ? Ch??m ?á  ?á làm gi?m s? c?ng c? l?ng và s?ng phù. ?á có tác d?ng nola?u nh?t n?u dùng marissa vòng 24-48 gi? katherine khi b? th??ng.  · B?c falguni ho?c túi ?á v?n marissa m?t t?m v?i webb ??a. (Không ???c ch??m ?á tr?c ti?p lên da c?a quý v?.)  · ??t ?á lên vùng l?ng b? ?au nola?u nh?t c?a quý v?.  · Không ???c ch??m ?á lâu quá 20 phút m?i l?n.  · Quý v? nên s? d?ng ?á và l?n m?i ngày.  ? Dùng thu?c  Thu?c gi?m ?au bán quentin qu?y g?m có aspirin, acetaminophen và ibuprofen. Chúng có th? giúp làm gi?m c?m giác khó ch?u. M?t s? thu?c c?ng gi?m s?ng phù.  · Hãy nói cho bác s? c?a quý v? v? các lo?i thu?c mà quý v? ?ã dùng.  · Ch? ???c u?ng thu?c brennen ch? d?n.  ? Ch??m nola?t  Katherine 50 gi? ??u tiên, nola?t có th? làm giãn c? và c?i thi?n tu?n hoàn máu.  · Th? t?m b?ng b?n ho?c vòi barrett sen n??c ?m. Ho?c s? d?ng ??m s??i ?m ??t ? ch? ?? th?p. ?? tránh b? b?ng, hãy ?? m?t t?m v?i cách gi?a quý v? và ??m s??i.  · Không ???c s? d?ng ??m s??i lâu quá 15 phút m?i l?n. Không ???c ng? trên ??m s??i.  Date Last Reviewed: 9/1/2015  © 7034-2756 [employer]. [address] [place] [address], [place] [address]. All rights reserved. This information is not intended as a substitute for professional medical care. Always follow your healthcare professional's instructions.        T? ch?m sóc ?au l?ng d??i (low back pain)  H?u h?t m?i ng??i ??u th?nh tho?ng b? ?au l?ng d??i. Marissa nola?u tr??ng h?p, v?n ?? không nghiêm tr?ng và ch? c?n t? ch?m sóc là ???c. ?ôi khi ?au l?ng d??i có th? là d?u hi?u cho th?y có m?t v?n ?? l?n h?n. Hãy g?i cho bác s? c?a quý v? n?u c?n ?au tr? l?i th??ng xuyên ho?c ngày càng t? h?n. ?? ch?m sóc l?ng c?a quý v? v? lâu dài, hãy th??ng xuyên t?p th? d?ng, gi?m cân n?ng d? th?a và h?c các t? th? t?t.    Ngh? ng?i ng?n  ?? l?ng c?a quý v? ngh? ng?i m?t willie ngày ?? h?i  ph?c. Dùng ??m c?ng ho?c sàn nhà. S? d?ng g?i ho?c kh?n nh? ?? ch?c ch?n ph?n l?ng d??i c?a quý v?. Gi? cho ??u g?i c?a quý v? h?i royer và ??t m?t chi?c g?i khác bên d??i. José Miguel vài gi?, ng?i d?y và ?i l?i càng nola?u càng t?t.  Gi?m ?au và s?ng  L?nh giúp gi?m s?ng. C? l?nh và nóng ??u có th? gi?m ?au. B?o v? da c?a quý v? b?ng cách ??t m?t t?m kh?n gi?a c? th? c?a quý v? và dhara?n l?nh ho?c nóng.  · Marissa vài ngày ??u tiên, ch??m m?t túi ?á marissa 10-15 phút m?i gi? marissa khi quý v? ?ang th?c.  · José Miguel vài ngày ??u, th? dùng nola?t ?? gi?m c?n ?au.  · Thu?c bán quentin qu?y có th? giúp ki?m soát c?n ?au và s?ng. Hãy th? aspirin ho?c thu?c thay th? aspirin, ch?ng h?n nh? ibuprofen.    T?p th? d?c  T?p th? d?c có th? giúp l?ng c?a quý v? h?i ph?c. Nó c?ng giúp l?ng c?a quý v? kh?e h?n và grupo ho?t h?n, ng?n ng?a tái ch?n th??ng. Hãy h?i bác s? c?a quý v? v? các bài t?p th? d?c c? th? dành cho l?ng c?a mình.  Áp d?ng t? th? t?t ?? tránh tái ch?n th??ng  · Khi di rubén?n, hãy royer hông và g?i. ??ng royer ? vùng th?t l?ng hay v?n quanh.  · Khi nâng, gi? ?? v?t sát v?i c? th? c?a quý v?. ??ng c? nâng ?? n?ng quá s?c c?a b?n thân.  · Khi ng?i, nh? ?? ph?n l?ng d??i c?a quý v?. S? d?ng kh?n qu?n quanh n?u c?n.  Hãy g?i cho bác s? c?a quý v? n?u:  · Quý v? không th? ??ng ho?c ?i b?.  · Quý v? b? s?t trên 101,0°F.  · Quý v? ?i ti?u th??ng xuyên, ?au ho?c ra máu.  · Quý v? b? ?au b?ng d? d?i.  · Quý v? b? ?au nh? c?t ho?c ?âm.  · C?n ?au c?a quý v? di?n ra không d?t.  · Quý v? b? ?au ho?c tê chân.  · Quý v? c?m th?y ?au ? m?t vùng m?i trên l?ng c?a mình.  · Quý v? ?? ý th?y c?n ?au không thuyên gi?m josé miguel h?n m?t tu?n.   Date Last Reviewed: 9/29/2015  © 1644-0158 [employer]. [address] [place] [address], [place] [address]. All rights reserved. This information is not intended as a substitute for professional medical care. Always follow your healthcare professional's instructions.      Back Exercise to Keep Fit for Low Back Pain  To  help in your recovery and to prevent further back problems, keep your back, abdominal muscles and legs strong. Walk daily as soon as you can. Gradually add other physical activities such as swimming and biking, which can help improve lower back strength. Begin as soon as you can do them comfortably. Do not do any exercises that make your pain a lot worse. The following are some back exercises that can help relieve low back pain.     Pelvic tilt   Repeat 5-10 times, twice per day.  Lie flat on your back (or stand with your back to a wall), knees bent, feet flat on the floor, body relaxed. Tighten your abdominal and buttock muscles, and tilt your pelvis. The curve of the small of your back should flatten towards the floor (or wall). Hold 10 seconds and then relax.       Knee raise     Repeat 5-10 times, twice per day.    Lie flat on your back, knees bent. Bring one knee slowly to your chest. Hug your knee gently. Then lower your leg toward the floor, keeping your knee bent. Do not straighten your legs. Repeat exercise with your other leg.              Partial press-up     Lie face down on a soft, firm surface. Do not turn your head to either side. Rest your arms bent at the elbows alongside your body. Relax for a few minutes. Then raise your upper body enough to lean on your elbows. Relax your lower back and legs as much as possible. Hold this position for 30 seconds at first. Gradually work up to five minutes. Or try slow press-ups. Hold each for five seconds, and repeat five to six times.              Copyright © 2012 by [employer]   Iman MTZ, Jazz D, Clifford ASENCIO, Herman B, Saroj R, Jaiden B, Kamari K, Sung C, Jennifer B, Cristhian S, Yaneth L, Sierra R. [employer]. Adult Acute and Subacute Low Back Pain. Updated November 2012.     An toàn cho l?ng: ??y và kéo  Vi?c ??y có th? gây khó kh?n cho l?ng c?a quý v?. Vi?c kéo th?m chí còn khó h?n. Vì  th?, quý v? nên ??y thay vì kéo n?u có th?. Hãy làm brennen các m?o ? b?ng này ?? giúp b?o v? l?ng c?a quý v?.    ??y m?t ?? v?t nh?  · T? t? khom g?i jenkins?ng. Gi? richard, vai và hông ? trên cùng ???ng th?ng.  · Ép ch?t các c? ? b?ng l?i.  · Nghiêng nh? v? phía ?? v?t mà quý v? ?ang ??y.  · Dùng willie chân và tr?ng l??ng c?a quý v? ?? di rubén?n ?? v?t.  · B??c t?ng b??c nh?.    ??y ?? v?t n?ng  · Ép ch?t các c? ? b?ng l?i.  · Khom g?i jenkins?ng.  · Nghiêng v? phía ?? v?t mà quý v? ?ang ??y. ?? v?t càng n?ng, quý v? càng ph?i nghiêng nola?u.  · C? g?ng ??ng u?n royer l?ng c?a quý v?: Hãy gi? l?ng ???c th?ng.  · Ramog willie tipton và tr?ng l??ng c?a quý v? ?? di rubén?n ?? v?t.  · B??c t?ng b??c nh?.    ??y  · ??i m?t v?i ?? v?t mà quý v? ?ang ??y.  · H?i khom nh? ??u g?i jenkins?ng.  · B??c lùi v? josé miguel và kéo ?? v?t brennen.  · Không ???c v?n c? th? c?a quý v?. N?u quý v? dùng m?t dee, ??t dee valentina lên hông c?a quý v? có th? giúp quý v? không b? v?n c? th?.  · Khi kéo các ?? v?t n?ng, ng? v? phía josé miguel, royer ? g?i và hông. Gi? willie dee c?a quý v? ???c th?ng. Hãy ?? tr?ng l??ng c?a quý v? kéo s?c n?ng.  Date Last Reviewed: 8/31/2015  © 6687-3566 [employer]. [address] [place] [address], [place] [address]. All rights reserved. This information is not intended as a substitute for professional medical care. Always follow your healthcare professional's instructions.

## 2018-10-01 NOTE — PROGRESS NOTES
[facility] CL[facility] NOTE   Chief Complaint   Patient presents with    Back Pain     low back pain         HPI: This is a 58 y.o.  male being seen in clinic today for follow up of left low back and leg pain.  He reports improvement with gabapentin, but still having throbbing and tingling in his lower leg and foot.     He was unable to have LILIBETH due to lack of insurance approval.    History obtained from patient via -family friend    Functional History:  Walking: limited  Transfers: Independent  Assistive devices: No  Power mobility: No  Falls: None     Needs help with:  Nothing - all ADLS normal    Cooking   Cleaning  Bathing   Dressing   Toileting     Past family, medical, social, and surgical history reviewed in chart    Review of Systems:     General- denies lethargy, weight change, fever, chills  Head/neck- denies swallowing difficulties  ENT- denies hearing changes  Cardiovascular-denies chest pain  Pulmonary- denies shortness of breath  GI- denies constipation or bowel incontinence  - denies bladder incontinence  Skin- denies wounds or rashes  Musculoskeletal- +weakness, +pain  Neurologic- +numbness and tingling  Psychiatric- denies depressive or psychotic features, denies anxiety  Lymphatic-denies swelling  Endocrine- denies hypoglycemic symptoms/DM history  All other pertinent systems negative     Physical Examination:  General: Well developed, well nourished male, NAD  HEENT:NCAT EOMI bilaterally   Pulmonary:Normal respirations    Spinal Examination: CERVICAL  Active ROM is within normal limits.  Inspection: No deformity of spinal alignment.    Spinal Examination: LUMBAR or THORACIC  Active ROM is full in ext/flex while sitting in chair  Inspection: No deformity of spinal alignment.  No palpable olisthesis.  Palpation: mild ttp at si joints and gluteus musculature   SLR Test (seated):neg on left    Bilateral Upper and Lower Extremities:  Pulses are 2+ at radial bilaterally.  Shoulder/Elbow/Wrist/Hand ROM    Hip/Knee/Ankle ROM wnl  Bilateral Extremities show normal capillary refill.  No signs of cyanosis, rubor, edema, skin changes, or dysvascular changes of appendages.  Nails appear intact.    Neurological Exam:  Cranial Nerves:  II-XII grossly intact    Manual Muscle Testing: (Motor 5=normal)  RIGHT Lower extremity: Hip flexion 5/5, Hip Abduction 5/5, Hip Adduction 5/5, Knee extension 5/5, Knee flexion 5/5, Ankle dorsiflexion 5/5, Extensor hallucis longus 5/5, Ankle plantarflexion 5/5  LEFT Lower extremity:  Hip flexion 4+/5, Hip Abduction 5/5,Hip Adduction 5/5, Knee extension 4+p/5, Knee flexion 5/5, Ankle dorsiflexion 5/5, Extensor hallucis longus 5/5, Ankle plantarflexion 5/5    No focal atrophy is noted of either upper or lower extremity.    Bilateral Reflexes:hypo at patellar  No clonus at knee or ankle.    Sensation: tested to light touch  - intact in legs except dec at left post-lateral leg to the dorsum of his foot  Gait: Not tested due to safety      IMPRESSION/PLAN: This is a 58 y.o.  male with left lumbar radiculopathy, lumbar DJD/DDD with extrusion    1. reviewed MRI  2. Pt unable to undergo injections with Dr Anders due to insurance limitations  3. Increase gabapentin 400mg TID  4. Handouts on lumbar radiculopathy, exercises provided to patient (in [place])  5. If not improving, [place] [facility] [place]wa Mosher M.D.  Physical Medicine and Rehab

## 2019-08-31 ENCOUNTER — HOSPITAL ENCOUNTER (EMERGENCY)
Facility: HOSPITAL | Age: 59
Discharge: HOME | End: 2019-08-31
Attending: EMERGENCY MEDICINE
Payer: MEDICAID

## 2019-08-31 VITALS
OXYGEN SATURATION: 97 % | WEIGHT: 139.75 LBS | HEIGHT: 62 IN | HEART RATE: 74 BPM | SYSTOLIC BLOOD PRESSURE: 137 MMHG | TEMPERATURE: 98 F | BODY MASS INDEX: 25.72 KG/M2 | DIASTOLIC BLOOD PRESSURE: 72 MMHG | RESPIRATION RATE: 16 BRPM

## 2019-08-31 DIAGNOSIS — M10.9 PODAGRA: Primary | ICD-10-CM

## 2019-08-31 PROCEDURE — 25000003 PHARM REV CODE 250: Performed by: NURSE PRACTITIONER

## 2019-08-31 PROCEDURE — 96372 THER/PROPH/DIAG INJ SC/IM: CPT

## 2019-08-31 PROCEDURE — 99284 EMERGENCY DEPT VISIT MOD MDM: CPT | Mod: 25

## 2019-08-31 PROCEDURE — 63600175 PHARM REV CODE 636 W HCPCS: Performed by: NURSE PRACTITIONER

## 2019-08-31 RX ORDER — TRAMADOL HYDROCHLORIDE 50 MG/1
50 TABLET ORAL EVERY 6 HOURS PRN
Qty: 12 TABLET | Refills: 0 | Status: SHIPPED | OUTPATIENT
Start: 2019-08-31 | End: 2019-09-10

## 2019-08-31 RX ORDER — INDOMETHACIN 50 MG/1
50 CAPSULE ORAL
Qty: 15 CAPSULE | Refills: 0 | Status: SHIPPED | OUTPATIENT
Start: 2019-08-31

## 2019-08-31 RX ORDER — HYDROCODONE BITARTRATE AND ACETAMINOPHEN 10; 325 MG/1; MG/1
1 TABLET ORAL
Status: COMPLETED | OUTPATIENT
Start: 2019-08-31 | End: 2019-08-31

## 2019-08-31 RX ORDER — DEXAMETHASONE SODIUM PHOSPHATE 4 MG/ML
8 INJECTION, SOLUTION INTRA-ARTICULAR; INTRALESIONAL; INTRAMUSCULAR; INTRAVENOUS; SOFT TISSUE
Status: COMPLETED | OUTPATIENT
Start: 2019-08-31 | End: 2019-08-31

## 2019-08-31 RX ADMIN — DEXAMETHASONE SODIUM PHOSPHATE 8 MG: 4 INJECTION, SOLUTION INTRAMUSCULAR; INTRAVENOUS at 02:08

## 2019-08-31 RX ADMIN — HYDROCODONE BITARTRATE AND ACETAMINOPHEN 1 TABLET: 10; 325 TABLET ORAL at 02:08

## 2019-08-31 NOTE — ED PROVIDER NOTES
Encounter Date: 8/31/2019       History     Chief Complaint   Patient presents with    Foot Pain     right foot pain x 2 weeks, pt went to PCP and got medication but not helping.     59 year old male with complaint of right great toe and foot pain X 2 weeks.  Reports history of gout.  No relief from pain mediation prescribed by PCP. NO fall. No injury.  NO trauma.          Review of patient's allergies indicates:  No Known Allergies  History reviewed. No pertinent past medical history.  Past Surgical History:   Procedure Laterality Date    NO PAST SURGERIES       History reviewed. No pertinent family history.  Social History     Tobacco Use    Smoking status: Current Every Day Smoker    Smokeless tobacco: Never Used   Substance Use Topics    Alcohol use: Yes    Drug use: Not on file     Review of Systems   Constitutional: Negative for fever.   HENT: Negative for sore throat.    Respiratory: Negative for shortness of breath.    Cardiovascular: Negative for chest pain.   Gastrointestinal: Negative for nausea.   Genitourinary: Negative for dysuria.   Musculoskeletal: Negative for back pain.        Foot pain    Skin: Negative for rash.   Neurological: Negative for weakness.   Hematological: Does not bruise/bleed easily.       Physical Exam     Initial Vitals [08/31/19 1407]   BP Pulse Resp Temp SpO2   137/72 74 16 98.4 °F (36.9 °C) 97 %      MAP       --         Physical Exam    Nursing note and vitals reviewed.  Constitutional: He appears well-developed and well-nourished.   HENT:   Head: Normocephalic and atraumatic.   Eyes: Conjunctivae are normal. Pupils are equal, round, and reactive to light.   Neck: Normal range of motion. Neck supple.   Cardiovascular: Normal rate, regular rhythm, normal heart sounds and intact distal pulses.   Pulmonary/Chest: Breath sounds normal.   Abdominal: Soft. There is no rebound and no guarding.   Musculoskeletal: Normal range of motion.   Tenderness and swelling base of right  great toe, mild swelling dorsal aspect of right foot, 2+ right dorsalis pedis pulse   Neurological: He is alert.   Skin: Skin is warm and dry.   Psychiatric: He has a normal mood and affect. His behavior is normal. Thought content normal.         ED Course   Procedures  Labs Reviewed - No data to display       Imaging Results    None                               Clinical Impression:       ICD-10-CM ICD-9-CM   1. Podagra M10.9 274.01                                Lorne Ragland NP  08/31/19 9112

## 2022-09-26 ENCOUNTER — PATIENT MESSAGE (OUTPATIENT)
Dept: RESEARCH | Facility: HOSPITAL | Age: 62
End: 2022-09-26
Payer: MEDICAID